# Patient Record
Sex: FEMALE | Race: WHITE | Employment: OTHER | URBAN - METROPOLITAN AREA
[De-identification: names, ages, dates, MRNs, and addresses within clinical notes are randomized per-mention and may not be internally consistent; named-entity substitution may affect disease eponyms.]

---

## 2019-06-05 ENCOUNTER — OFFICE VISIT (OUTPATIENT)
Dept: ORTHOPEDIC SURGERY | Age: 69
End: 2019-06-05

## 2019-06-05 VITALS
SYSTOLIC BLOOD PRESSURE: 142 MMHG | HEART RATE: 86 BPM | RESPIRATION RATE: 16 BRPM | WEIGHT: 134.4 LBS | BODY MASS INDEX: 23.81 KG/M2 | DIASTOLIC BLOOD PRESSURE: 80 MMHG

## 2019-06-05 DIAGNOSIS — M54.50 NONSPECIFIC PAIN IN THE LUMBAR REGION: Primary | ICD-10-CM

## 2019-06-05 RX ORDER — TRAMADOL HYDROCHLORIDE 50 MG/1
50 TABLET ORAL
Qty: 20 TAB | Refills: 0 | Status: SHIPPED | OUTPATIENT
Start: 2019-06-05 | End: 2019-06-05

## 2019-06-05 NOTE — PROGRESS NOTES
[address]  [address], [address]  [place]  Phone: [phone]  Fax: [phone]  INITIAL CONSULTATION  Patient: Suki Nunn                MRN: [id number]       SSN: [id number]  YOB: 1950        AGE: 70 y.o. SEX: female  Body mass index is 23.81 kg/m². PCP: Jeane Reynolds MD  06/05/19    Chief Complaint   Patient presents with    Back Pain     NEW Patient SX with Dr. Dominga Lynn 2014    Neck Pain         HISTORY OF PRESENT ILLNESS, RADIOGRAPHS, and PLAN:         HISTORY OF PRESENT ILLNESS:  Ms. Cem Jurado is seen today at the request of Dr. Theresa Purdy. Ms. Cem Jurado is a 56-year-old female known to me. I performed a lumbar laminectomy with excellent result back in 2014. In the meantime, she has been a rectal cancer survivor treated by Dr. Noemí Marte. She is in remission at this time. She has had a severe atraumatic onset of back pain the past several weeks, really severe for the past two to three weeks in her lumbosacral junction. She associates it with a sense of malaise with some sense of fever and chills but no measurable fever or chills. She rates her pain at an 8-9/10 unresponsive to local remedies: Ice, heat, anti-inflammatories, and Tylenol. She has no radiculopathy. The pain goes from her mid-sacrum to her mid-lumbar region. She has some tenderness to palpation. She has no spinal spasm. She has no weight loss, weight gain, and no bowel or bladder dysfunction. Plain radiographs of her lumbar spine demonstrate an osteopenia. Otherwise, there are mild degenerative changes without instability. ASSESSMENT/PLAN: Given her recent bout of rectal cancer, her sense of constitutional symptoms, and the severity of her local pain, I think it is reasonable to obtain an MRI of her lumbar spine and sacrum. I will see her back following those studies. I will provide her some Tramadol for pain control.   We will also start her in some physical therapy. I will see her back following these studies. cc: Jennifer Mukherjee M.D. Past Medical History:   Diagnosis Date    Abnormal renal function test     secondary to Ibuprofen - resolved    Arthritis     Blood in stool     Cataracts, bilateral     congenital    GERD (gastroesophageal reflux disease)     Glaucoma (increased eye pressure)     Hypertension     Normal pressure glaucoma     Rectal mass     Spinal stenosis     Thyroid disease        Family History   Problem Relation Age of Onset    Cancer Mother     Heart Disease Father        Current Outpatient Medications   Medication Sig Dispense Refill    acetaminophen (TYLENOL) 325 mg tablet 2 Tabs.  montelukast (SINGULAIR) 10 mg tablet Take 1 tablet every day by oral route.  pantoprazole (PROTONIX) 20 mg tablet pantoprazole 20 mg tablet,delayed release      glucosamine-D3-Boswellia serr (OSTEO BI-FLEX, 5-LOXIN,) 1,500-400-100 mg-unit-mg tab Osteo Bi-Flex      aspirin delayed-release 81 mg tablet Take 81 mg by mouth daily.  EPIPEN 2-JIA 0.3 mg/0.3 mL injection       Calcium-Cholecalciferol, D3, (CALCIUM 600 WITH VITAMIN D3) 600 mg(1,500mg) -400 unit cap Take  by mouth daily. Indications: every other week      lidocaine (LIDODERM) 5 %(700 mg/patch) by TransDERmal route every twenty-four (24) hours. Apply patch to the affected area for 12 hours a day and remove for 12 hours a day.  fluticasone (FLONASE) 50 mcg/actuation nasal spray 2 sprays by Both Nostrils route as needed for Rhinitis.  traZODone (DESYREL) 50 mg tablet Take  by mouth nightly.  esomeprazole (NEXIUM) 40 mg capsule Take 40 mg by mouth every other day.  levothyroxine (SYNTHROID) 75 mcg tablet Take  by mouth Daily (before breakfast).  lisinopril (PRINIVIL, ZESTRIL) 20 mg tablet Take  by mouth daily.  Hydrochlorothiazide 12.5 mg tablet Take 12.5 mg by mouth daily.       cetirizine (ZYRTEC) 10 mg tablet Take  by mouth daily.  latanoprost (XALATAN) 0.005 % ophthalmic solution Administer 1 Drop to both eyes nightly.  carboxymethylcellulose sodium (REFRESH TEARS) 0.5 % drop ophthalmic solution Administer 1 Drop to both eyes.  ergocalciferol (VITAMIN D2) 50,000 unit capsule Take 50,000 Units by mouth Once every 2 weeks.  Every other week         Allergies   Allergen Reactions    Ibuprofen Other (comments)     Altered kidney function tests    Amoxicillin Hives    Nsaids (Non-Steroidal Anti-Inflammatory Drug) Other (comments)     Altered kidney function tests       Past Surgical History:   Procedure Laterality Date    FLEXIBLE SIGMOIDOSCOPY N/A 9/26/2018    FLEXIBLE SIGMOIDOSCOPY performed by Teagan Ordonez MD at [address] HX BREAST BIOPSY      HX COLONOSCOPY  2009    HX HEENT Bilateral 1951    Eye Sx x 2    HX HEENT Right 2010    calcification    HX HYSTERECTOMY      HX LUMBAR LAMINECTOMY Right 11/17/2014    Rt L5 Hemilaminotomy and Medial Facetectomy/Disckectomy    HX ORTHOPAEDIC Right 11/17/2014    Right L5 Hemilaminotomy and Medial Facetectomy/Diskectomy    HX OTHER SURGICAL  2/2014    Endoscopy    HX TONSIL AND ADENOIDECTOMY      HX TONSILLECTOMY         Past Medical History:   Diagnosis Date    Abnormal renal function test     secondary to Ibuprofen - resolved    Arthritis     Blood in stool     Cataracts, bilateral     congenital    GERD (gastroesophageal reflux disease)     Glaucoma (increased eye pressure)     Hypertension     Normal pressure glaucoma     Rectal mass     Spinal stenosis     Thyroid disease        Social History     Socioeconomic History    Marital status:      Spouse name: Not on file    Number of children: Not on file    Years of education: Not on file    Highest education level: Not on file   Occupational History    Not on file   Social Needs    Financial resource strain: Not on file    Food insecurity:     Worry: Not on file Inability: Not on file    Transportation needs:     Medical: Not on file     Non-medical: Not on file   Tobacco Use    Smoking status: Never Smoker    Smokeless tobacco: Never Used   Substance and Sexual Activity    Alcohol use: No    Drug use: No    Sexual activity: Not on file   Lifestyle    Physical activity:     Days per week: Not on file     Minutes per session: Not on file    Stress: Not on file   Relationships    Social connections:     Talks on phone: Not on file     Gets together: Not on file     Attends Tenriism service: Not on file     Active member of club or organization: Not on file     Attends meetings of clubs or organizations: Not on file     Relationship status: Not on file    Intimate partner violence:     Fear of current or ex partner: Not on file     Emotionally abused: Not on file     Physically abused: Not on file     Forced sexual activity: Not on file   Other Topics Concern    Not on file   Social History Narrative    Not on file           REVIEW OF SYSTEMS:   CONSTITUTIONAL SYMPTOMS:  Negative. EYES:  Negative. EARS, NOSE, THROAT AND MOUTH:  Negative. CARDIOVASCULAR:  Negative. RESPIRATORY:  Negative. GENITOURINARY: Per HPI. GASTROINTESTINAL:  Per HPI. INTEGUMENTARY (SKIN AND/OR BREAST):  Negative. MUSCULOSKELETAL: Per HPI.   ENDOCRINE/RHEUMATOLOGIC:  Negative. NEUROLOGICAL:  Per HPI. HEMATOLOGIC/LYMPHATIC:  Negative. ALLERGIC/IMMUNOLOGIC:  Negative. PSYCHIATRIC:  Negative. PHYSICAL EXAMINATION:   Visit Vitals  /80 (BP 1 Location: Left arm, BP Patient Position: Sitting)   Pulse 86   Resp 16   Wt 134 lb 6.4 oz (61 kg)   BMI 23.81 kg/m²    PAIN SCALE: 8/10    CONSTITUTIONAL: The patient is in no apparent distress and is alert and oriented x 3. HEENT: Normocephalic. Hearing grossly intact. NECK: Supple and symmetric. no tenderness, or masses were felt. RESPIRATORY: No labored breathing.   CARDIOVASCULAR: The carotid pulses were normal. Peripheral pulses were 2+. CHEST: Normal AP diameter and normal contour without any kyphoscoliosis. LYMPHATIC: No lymphadenopathy was appreciated in the neck, axillae or groin. SKIN:  Negative for scars, rashes, lesions, or ulcers on the right upper, right lower, left upper, left lower and trunk. NEUROLOGICAL: Alert and oriented x 3. Ambulation without assistive device. FWB. EXTREMITIES:  See musculoskeletal.  MUSCULOSKELETAL:   Head and Neck: Neck pain. Negative for misalignment, asymmetry, crepitation, defects, tenderness masses or effusions.  Left Upper Extremity: Inspection, percussion and palpation performed. Munozs sign is negative.  Right Upper Extremity: Inspection, percussion and palpation performed. Munozs sign is negative.  Spine, Ribs and Pelvis: Low back pain. Inspection, percussion and palpation performed. Negative for misalignment, asymmetry, crepitation, defects, tenderness masses or effusions.  Left Lower Extremity: Inspection, percussion and palpation performed. Negative straight leg raise.  Right Lower Extremity: Inspection, percussion and palpation performed. Negative straight leg raise. SPINE EXAM:     Cervical spine: Neck is midline. Normal muscle tone. No focal atrophy is noted. Lumbar spine: No rash, ecchymosis, or gross obliquity. No focal atrophy is noted. ASSESSMENT    ICD-10-CM ICD-9-CM    1. Nonspecific pain in the lumbar region M54.5 724.2 AMB POC XRAY, SPINE, LUMBOSACRAL; 4+      REFERRAL TO PHYSICAL THERAPY      MRI LUMB SPINE W WO CONT      traMADol (ULTRAM) 50 mg tablet       Written by Adeline Camilo, as dictated by Eleazar Waller MD.    I, Dr. Eleazar Waller MD, confirm that all documentation is accurate.

## 2019-06-10 ENCOUNTER — TELEPHONE (OUTPATIENT)
Dept: ORTHOPEDIC SURGERY | Age: 69
End: 2019-06-10

## 2019-06-10 NOTE — TELEPHONE ENCOUNTER
Patient would like to have her MRI done at [facility].   It was ordered on Tuesday 6/5/19 by Dr. Anthony Ferguson

## 2019-06-10 NOTE — TELEPHONE ENCOUNTER
Order and office notes faxed to [facility] Scheduling to have them set up MRI L-Spine and to notify the patient of date. They will authorize with the insurance if needed. Patient aware.

## 2019-06-13 ENCOUNTER — TELEPHONE (OUTPATIENT)
Dept: ORTHOPEDIC SURGERY | Age: 69
End: 2019-06-13

## 2019-06-13 NOTE — TELEPHONE ENCOUNTER
Valentín Olvera from [facility] called to advise that they always include the sacrum when performing lumbar MRI, however, if we need dedicated sacrum imaging, they require a separate order.

## 2019-06-14 DIAGNOSIS — S32.10XA CLOSED FRACTURE OF SACRUM, UNSPECIFIED FRACTURE MORPHOLOGY, INITIAL ENCOUNTER (HCC): Primary | ICD-10-CM

## 2019-06-21 DIAGNOSIS — M54.50 NONSPECIFIC PAIN IN THE LUMBAR REGION: ICD-10-CM

## 2019-06-25 ENCOUNTER — OFFICE VISIT (OUTPATIENT)
Dept: ORTHOPEDIC SURGERY | Age: 69
End: 2019-06-25

## 2019-06-25 VITALS
HEART RATE: 84 BPM | DIASTOLIC BLOOD PRESSURE: 73 MMHG | BODY MASS INDEX: 23.57 KG/M2 | WEIGHT: 133 LBS | TEMPERATURE: 97.7 F | HEIGHT: 63 IN | SYSTOLIC BLOOD PRESSURE: 107 MMHG | OXYGEN SATURATION: 100 %

## 2019-06-25 DIAGNOSIS — M84.48XA SACRAL INSUFFICIENCY FRACTURE, INITIAL ENCOUNTER: Primary | ICD-10-CM

## 2019-06-25 RX ORDER — ROSUVASTATIN CALCIUM 10 MG/1
10 TABLET, COATED ORAL
COMMUNITY

## 2019-06-25 RX ORDER — OMEPRAZOLE 40 MG/1
40 CAPSULE, DELAYED RELEASE ORAL DAILY
COMMUNITY

## 2019-06-25 NOTE — PROGRESS NOTES
[address]  [address], [address]  [place] [address]  Phone: [phone]  Fax: [phone]  PROGRESS NOTE  Patient: Last Salazar                MRN: [id number]       SSN: [id number]  YOB: 1950        AGE: 70 y.o. SEX: female  Body mass index is 23.56 kg/m². PCP: Zahida Alegre MD  06/25/19    No chief complaint on file. HISTORY OF PRESENT ILLNESS, RADIOGRAPHS, and PLAN:     HISTORY OF PRESENT ILLNESS:  Ms. Vish Chambers returns today. She is continuing to have lumbosacral back pain. It is moderated some. It has gone from 6-8/10 to a 4-6/10. I discussed the findings on her MRI, which is a sacral insufficiency fracture. I agree with the radiologist that it would be amenable to cement augmentation. Discussed that with her, and she would like to proceed in that direction. ASSESSMENT/PLAN:  I have referred her to interventional radiography for evaluation of cement augmentation for a sacral insufficiency fracture. we will progress from there. I will see her back again in 8 weeks time. Past Medical History:   Diagnosis Date    Abnormal renal function test     secondary to Ibuprofen - resolved    Arthritis     Blood in stool     Cataracts, bilateral     congenital    GERD (gastroesophageal reflux disease)     Glaucoma (increased eye pressure)     Hypertension     Normal pressure glaucoma     Rectal mass     Spinal stenosis     Thyroid disease        Family History   Problem Relation Age of Onset    Cancer Mother     Heart Disease Father        Current Outpatient Medications   Medication Sig Dispense Refill    omeprazole (PRILOSEC) 40 mg capsule Take 40 mg by mouth daily.  rosuvastatin (CRESTOR) 10 mg tablet Take 10 mg by mouth nightly.  alendronate sodium (FOSAMAX PO) Take 70 mg by mouth.  montelukast (SINGULAIR) 10 mg tablet Take 1 tablet every day by oral route.       glucosamine-D3-Boswellia serr (OSTEO BI-FLEX, 5-LOXIN,) 1,500-400-100 mg-unit-mg tab Osteo Bi-Flex      Calcium-Cholecalciferol, D3, (CALCIUM 600 WITH VITAMIN D3) 600 mg(1,500mg) -400 unit cap Take  by mouth daily. Indications: every other week      lidocaine (LIDODERM) 5 %(700 mg/patch) by TransDERmal route every twenty-four (24) hours. Apply patch to the affected area for 12 hours a day and remove for 12 hours a day.  fluticasone (FLONASE) 50 mcg/actuation nasal spray 2 sprays by Both Nostrils route as needed for Rhinitis.  traZODone (DESYREL) 50 mg tablet Take  by mouth nightly.  levothyroxine (SYNTHROID) 75 mcg tablet Take  by mouth Daily (before breakfast).  lisinopril (PRINIVIL, ZESTRIL) 20 mg tablet Take 10 mg by mouth daily.  Hydrochlorothiazide 12.5 mg tablet Take 12.5 mg by mouth daily.  cetirizine (ZYRTEC) 10 mg tablet Take  by mouth daily.  latanoprost (XALATAN) 0.005 % ophthalmic solution Administer 1 Drop to both eyes nightly.  carboxymethylcellulose sodium (REFRESH TEARS) 0.5 % drop ophthalmic solution Administer 1 Drop to both eyes.  ergocalciferol (VITAMIN D2) 50,000 unit capsule Take 50,000 Units by mouth Once every 2 weeks. Every other week      acetaminophen (TYLENOL) 325 mg tablet 2 Tabs.  pantoprazole (PROTONIX) 20 mg tablet pantoprazole 20 mg tablet,delayed release      aspirin delayed-release 81 mg tablet Take 81 mg by mouth daily.  EPIPEN 2-JIA 0.3 mg/0.3 mL injection       esomeprazole (NEXIUM) 40 mg capsule Take 40 mg by mouth every other day.          Allergies   Allergen Reactions    Ibuprofen Other (comments)     Altered kidney function tests    Amoxicillin Hives    Nsaids (Non-Steroidal Anti-Inflammatory Drug) Other (comments)     Altered kidney function tests       Past Surgical History:   Procedure Laterality Date    FLEXIBLE SIGMOIDOSCOPY N/A 9/26/2018    FLEXIBLE [facility]  HX COLONOSCOPY  2009    HX HEENT Bilateral 1951    Eye Sx x 2    HX HEENT Right 2010    calcification    HX HYSTERECTOMY      HX LUMBAR LAMINECTOMY Right 11/17/2014    Rt L5 Hemilaminotomy and Medial Facetectomy/Disckectomy    HX ORTHOPAEDIC Right 11/17/2014    Right L5 Hemilaminotomy and Medial Facetectomy/Diskectomy    HX OTHER SURGICAL  2/2014    Endoscopy    HX TONSIL AND ADENOIDECTOMY      HX TONSILLECTOMY         Past Medical History:   Diagnosis Date    Abnormal renal function test     secondary to Ibuprofen - resolved    Arthritis     Blood in stool     Cataracts, bilateral     congenital    GERD (gastroesophageal reflux disease)     Glaucoma (increased eye pressure)     Hypertension     Normal pressure glaucoma     Rectal mass     Spinal stenosis     Thyroid disease        Social History     Socioeconomic History    Marital status:      Spouse name: Not on file    Number of children: Not on file    Years of education: Not on file    Highest education level: Not on file   Occupational History    Not on file   Social Needs    Financial resource strain: Not on file    Food insecurity:     Worry: Not on file     Inability: Not on file    Transportation needs:     Medical: Not on file     Non-medical: Not on file   Tobacco Use    Smoking status: Never Smoker    Smokeless tobacco: Never Used   Substance and Sexual Activity    Alcohol use: No    Drug use: No    Sexual activity: Not on file   Lifestyle    Physical activity:     Days per week: Not on file     Minutes per session: Not on file    Stress: Not on file   Relationships    Social connections:     Talks on phone: Not on file     Gets together: Not on file     Attends Adventism service: Not on file     Active member of club or organization: Not on file     Attends meetings of clubs or organizations: Not on file     Relationship status: Not on file    Intimate partner violence:     Fear of current or ex partner: Not on file     Emotionally abused: Not on file     Physically abused: Not on file     Forced sexual activity: Not on file   Other Topics Concern    Not on file   Social History Narrative    Not on file         REVIEW OF SYSTEMS:   CONSTITUTIONAL SYMPTOMS:  Negative. EYES:  Negative. EARS, NOSE, THROAT AND MOUTH:  Negative. CARDIOVASCULAR:  Negative. RESPIRATORY:  Negative. GENITOURINARY: Per HPI. GASTROINTESTINAL:  Per HPI. INTEGUMENTARY (SKIN AND/OR BREAST):  Negative. MUSCULOSKELETAL: Per HPI.   ENDOCRINE/RHEUMATOLOGIC:  Negative. NEUROLOGICAL:  Per HPI. HEMATOLOGIC/LYMPHATIC:  Negative. ALLERGIC/IMMUNOLOGIC:  Negative. PSYCHIATRIC:  Negative. PHYSICAL EXAMINATION:   Visit Vitals  /73 (BP 1 Location: Left arm, BP Patient Position: Sitting)   Pulse 84   Temp 97.7 °F (36.5 °C) (Oral)   Ht 5' 3\" (1.6 m)   Wt 133 lb (60.3 kg)   SpO2 100%   BMI 23.56 kg/m²    PAIN SCALE: 5/10    CONSTITUTIONAL: The patient is in no apparent distress and is alert and oriented x 3. HEENT: Normocephalic. Hearing grossly intact. NECK: Supple and symmetric. no tenderness, or masses were felt. RESPIRATORY: No labored breathing. CARDIOVASCULAR: The carotid pulses were normal. Peripheral pulses were 2+. CHEST: Normal AP diameter and normal contour without any kyphoscoliosis. LYMPHATIC: No lymphadenopathy was appreciated in the neck, axillae or groin. SKIN:  Negative for scars, rashes, lesions, or ulcers on the right upper, right lower, left upper, left lower and trunk. NEUROLOGICAL: Alert and oriented x 3. Ambulation without assistive device. FWB. EXTREMITIES: See musculoskeletal.  MUSCULOSKELETAL:   Head and Neck:  Negative for misalignment, asymmetry, crepitation, defects, tenderness masses or effusions.  Left Upper Extremity: Inspection, percussion and palpation performed. Munozs sign is negative.  Right Upper Extremity: Inspection, percussion and palpation performed.    Munozs sign is negative.  Spine, Ribs and Pelvis: Back pain. Inspection, percussion and palpation performed. Negative for misalignment, asymmetry, crepitation, defects, tenderness masses or effusions.  Left Lower Extremity: Inspection, percussion and palpation performed. Negative straight leg raise.  Right Lower Extremity: Inspection, percussion and palpation performed. Negative straight leg raise. SPINE EXAM:     Lumbar spine: No rash, ecchymosis, or gross obliquity. No focal atrophy is noted. ASSESSMENT    ICD-10-CM ICD-9-CM    1. Sacral insufficiency fracture, initial encounter M84.48XA 733.13        Written by Divya Cha, as dictated by Marcus Ernandez MD.    I, Dr. Marcus Ernandez MD, confirm that all documentation is accurate.